# Patient Record
Sex: FEMALE | Employment: OTHER | URBAN - METROPOLITAN AREA
[De-identification: names, ages, dates, MRNs, and addresses within clinical notes are randomized per-mention and may not be internally consistent; named-entity substitution may affect disease eponyms.]

---

## 2022-09-15 ENCOUNTER — OFFICE VISIT (OUTPATIENT)
Dept: ORTHOPEDIC SURGERY | Age: 42
End: 2022-09-15
Payer: COMMERCIAL

## 2022-09-15 DIAGNOSIS — M25.571 ACUTE RIGHT ANKLE PAIN: Primary | ICD-10-CM

## 2022-09-15 PROCEDURE — 99204 OFFICE O/P NEW MOD 45 MIN: CPT | Performed by: ORTHOPAEDIC SURGERY

## 2022-09-15 RX ORDER — MELOXICAM 15 MG/1
15 TABLET ORAL DAILY
Qty: 30 TABLET | Refills: 0 | Status: SHIPPED | OUTPATIENT
Start: 2022-09-15

## 2022-09-15 NOTE — PROGRESS NOTES
The patient was prescribed a walker boot and ½ heel lift for the patient's right foot. The patient wears a size 9 shoe and I fitted them with a M size boot. There was also a pad placed on the Achilles to prevent from rubbing while in the boot. It was also stressed that the patient take the boot/sock off as much as possible to prevent infection and to insure healing. The patient was told to peel the heel lift every 3-5 days. The patient was fitted and instructed on the use of prescribed walker boot. I explained how to fit themselves and that the plastic flexible piece should always be on the front of the boot and secured by the Velcro straps on top. The air bladder in the boot was adjusted according to proper fit and comfort. The patient was instructed to walk heel toe once the patient starts applying pressure. I also explained that they need a heel lift or a higher heeled shoe for the uninvolved LE to help normalize gait and avoid excessive low back stress/strain due to leg length inequality created from walker boot. Patient read and signed documenting they understand and agree to [facility]'s current DME return policy.

## 2022-09-15 NOTE — PROGRESS NOTES
Name: Johnny Thornton  YOB: 1980  Gender: female  MRN: [id number]    Summary:  insertion achilles tendonitis with calcification. Start physical therapy, meloxicam, Voltaren gel, heel lifts, CAM Walker boot. Follow-up 2 months: If not improved consider night splint, air heel, short-term Medrol. No x-rays needed. CC: right Heel pain     HPI: Johnny Thornotn is a 40 y.o. female who presents with increasing posterior ankle pain located at the Achilles tendon region. They do remember any acute event that started. It started approximately September 2021 when she slipped and hit her heel on the back of a piece of furniture/metal.  She sustained a small laceration. Since then she has continued to have pain. It has been going on for since September 2021 and has gotten worse. The pain as a burning tearing pain, tender to palpation and pressure with shoe wear, and it limits daily activities. I also described some swelling in this region also. She has been treated by her primary care. She has started physical therapy. She has treated it with over-the-counter nonsteroidals and altered shoewear. She is activity modified and she no longer exercises. History was obtained by patient    ROS/Meds/PSH/PMH/FH/SH: I personally reviewed the patients standard intake form. Below are the pertinents    Tobacco:  has no history on file for tobacco use. Diabetes: None  Other: none    Physical Examination:  right Lower Extremity: FROM actively of toes, foot, ankle, knee and hip. No instability of foot or ankle with drawer and stress. 5/5 strength to TA/EHL/GSC/Peroneals/PTib. No skin lesions, Non TTP throughout. 2+ dp pulse w/ cap refill of 5 toes <2s. Dorsiflexion of the ankle produces pain at the Achilles insertion. Hind foot alignment mild varus. There is some edema, mild erythema, and TTP at the Achilles at the same point of tenderness. + silverskoid. Achilles has no defects but it is painful. Cavovarus foot posture when standing. Talar tilt exam : normal  Anterior drawer exam w/ ankle plantarflexed at 20 deg: normal    Neuro:  normal SILT to s/s/sp/dp/t. Reflexes normal: 1+ patella reflex bilaterally, 1+ achilles reflex bilaterally, negative babinski bilaterally. no signs of hyper reflexia or absent reflex    Vascular: radial=4/4, femoral=4/4, popliteal=4/4, dorsalis pedis=4/4,       Imaging:   I independently interpreted XR taken today and   3 views (AP/Lat/Oblique) of the on right ankle for achilles pain/ankle pain   Findings: no signs of acute fractures or dislocations. No signs of neoplastic process. There is some age related degenerative changes noted in the ankle but these are minimal.  The achilles tendon at the insertion shows some calcifications and there is an enlarged calcaneal process indicative of a Claudia deformity. Impression:  Insertional calcific achilles tendonitis. Pete Reynolds MD         Assessment:   insertion Achilles Tendonitis    Plan:     4 This is a chronic illness/condition with exacerbation and progression  Treatment at this time: Prescription Drug Management - Start physical therapy, meloxicam, Voltaren gel, heel lifts, CAM Walker boot. Studies ordered: NO XR needed @ Next Visit    Weight-bearing status: WBAT        Return to work/work restrictions: none  Mobic 15mg p.o. qday x 14 days: An Rx was given. We discussed the use of Mobic. I advised not to combine it with other NSAIDS such as advil, motrin, nor aleve. I discussed Mobic and its affect on the GI system, its risk of ulcer formation/exacerbation. I also discussed its affects on the kidneys and risk of nephritis and kidney damage. We discussed how it can alter your blood coagulability and limit platelet function, its negative affect on coronary artery disease, and how excessive alcohol use with Mobic can make all these problems worse.      Boni Cline MD    no surgery today - proceed with non op

## 2022-10-24 RX ORDER — MELOXICAM 15 MG/1
TABLET ORAL
Qty: 30 TABLET | Refills: 0 | OUTPATIENT
Start: 2022-10-24

## 2022-11-03 ENCOUNTER — TELEPHONE (OUTPATIENT)
Dept: ORTHOPEDIC SURGERY | Age: 42
End: 2022-11-03

## 2022-11-07 ENCOUNTER — TELEPHONE (OUTPATIENT)
Dept: ORTHOPEDIC SURGERY | Age: 42
End: 2022-11-07

## 2022-11-18 ENCOUNTER — OFFICE VISIT (OUTPATIENT)
Dept: ORTHOPEDIC SURGERY | Age: 42
End: 2022-11-18

## 2022-11-18 DIAGNOSIS — M25.571 ACUTE RIGHT ANKLE PAIN: Primary | ICD-10-CM

## 2022-11-18 RX ORDER — METHYLPREDNISOLONE 4 MG/1
TABLET ORAL
Qty: 1 KIT | Refills: 0 | Status: SHIPPED | OUTPATIENT
Start: 2022-11-18 | End: 2022-11-24

## 2022-11-18 NOTE — PROGRESS NOTES
Name: Lucy Baldwin  YOB: 1980  Gender: female  MRN: [id number]    Summary:  insertion achilles tendonitis with calcification -   Physical therapy, meloxicam, Voltaren gel, and walker boot have helped some. She is already using a night splint. She is having improvement but still painful. 11/18/2022: Prescription for Medrol Dosepak, Achilles air heel, continue physical therapy    Follow-up 2 months. If still painful consider MRI       CC: right Heel pain     HPI: Lucy Baldwin is a 40 y.o. female who presents with increasing posterior ankle pain located at the Achilles tendon region. They do remember any acute event that started. It started approximately September 2021 when she slipped and hit her heel on the back of a piece of furniture/metal.  She sustained a small laceration. Since then she has continued to have pain. It has been going on for since September 2021 and has gotten worse. The pain as a burning tearing pain, tender to palpation and pressure with shoe wear, and it limits daily activities. I also described some swelling in this region also. She has been treated by her primary care. She has started physical therapy. She has treated it with over-the-counter nonsteroidals and altered shoewear. She is activity modified and she no longer exercises. 11/18/2022-second encounter with the patient. She has been treated with 6 consecutive weeks of physical therapy, meloxicam, Voltaren gel,  night splintand a walker boot. She has made some improvements but she still continues to have pain at the insertion. She states she is doing better but still struggles. She is also using a night splint. History was obtained by patient    ROS/Meds/PSH/PMH/FH/SH: I personally reviewed the patients standard intake form. Below are the pertinents    Tobacco:  has no history on file for tobacco use.   Diabetes: None  Other: none    Physical Examination:  right Lower Extremity: FROM actively of toes, foot, ankle, knee and hip. No instability of foot or ankle with drawer and stress. 5/5 strength to TA/EHL/GSC/Peroneals/PTib. No skin lesions, Non TTP throughout. 2+ dp pulse w/ cap refill of 5 toes <2s. Dorsiflexion of the ankle produces pain at the Achilles insertion. Hind foot alignment mild varus. There is some edema, mild erythema, and TTP at the Achilles at the same point of tenderness. + silverskoid. Achilles has no defects but it is painful. Cavovarus foot posture when standing. Talar tilt exam : normal  Anterior drawer exam w/ ankle plantarflexed at 20 deg: normal    Neuro:  normal SILT to s/s/sp/dp/t. Reflexes normal: 1+ patella reflex bilaterally, 1+ achilles reflex bilaterally, negative babinski bilaterally. no signs of hyper reflexia or absent reflex    Vascular: radial=4/4, femoral=4/4, popliteal=4/4, dorsalis pedis=4/4,       Imaging:   I independently interpreted XR taken today and   3 views (AP/Lat/Oblique) of the on right ankle for achilles pain/ankle pain   Findings: no signs of acute fractures or dislocations. No signs of neoplastic process. There is some age related degenerative changes noted in the ankle but these are minimal.  The achilles tendon at the insertion shows some calcifications and there is an enlarged calcaneal process indicative of a Claudia deformity. Impression:  Insertional calcific achilles tendonitis. Ashley Tejada MD         Assessment:   insertion Achilles Tendonitis    Plan:     4 This is a chronic illness/condition with exacerbation and progression  Treatment at this time: Prescription Drug Management -we will get her out of the cam walker boot and put her into an Achilles air heel brace and she will slowly become less boot dependent. I did discuss with her the risk of wearing the boot too long. I still want her to use a night splint. She will restart physical therapy as it helps.   Studies ordered: NO XR needed @ Next Visit    Weight-bearing status: WBAT        Return to work/work restrictions: none  Medrol Dose pack (6 day oral taper): I discussed medrol being a steroid and how it can elevate blood sugars. I recommended to monitor sugars closely and to beware of symptoms such as lethargy, excessive thirst, polyuria, and GI distress. We also discussed the dose pack taper format and how long term steroid use can alter adrenal function. I also discussed steroids effect on depression and mood. How in some people it can exacerbate underlying depression while in others create a more manic response.      Genevieve Cervantes MD    no surgery today - proceed with non op

## 2022-11-18 NOTE — PROGRESS NOTES
The patient was prescribed and given an aircast airheel for the right foot, size medium. She was instructed on use of the brace during the visit. Patient read and signed documenting they understand and agree to [facility]'s current DME return policy.

## 2022-11-21 ENCOUNTER — TELEPHONE (OUTPATIENT)
Dept: ORTHOPEDIC SURGERY | Age: 42
End: 2022-11-21

## 2022-11-21 NOTE — TELEPHONE ENCOUNTER
Received leave form from [place], sent to [facility] for completion

## 2022-11-25 ENCOUNTER — TELEPHONE (OUTPATIENT)
Dept: ORTHOPEDIC SURGERY | Age: 42
End: 2022-11-25

## 2022-11-25 DIAGNOSIS — M25.571 ACUTE RIGHT ANKLE PAIN: Primary | ICD-10-CM

## 2022-12-07 ENCOUNTER — TELEPHONE (OUTPATIENT)
Dept: ORTHOPEDIC SURGERY | Age: 42
End: 2022-12-07

## 2022-12-13 ENCOUNTER — TELEPHONE (OUTPATIENT)
Dept: ORTHOPEDIC SURGERY | Age: 42
End: 2022-12-13

## 2022-12-13 NOTE — TELEPHONE ENCOUNTER
So records that were sent in to [facility] has her as WBAT no restricitions and she says she is not able to return to work so this may be having to kick over to [employer] term disability so she needs to speak to you about a work note to continue her coverage?  I told her she may have to come back in for eval but she would need to discuss that with you

## 2023-01-17 ENCOUNTER — OFFICE VISIT (OUTPATIENT)
Dept: ORTHOPEDIC SURGERY | Age: 43
End: 2023-01-17
Payer: MEDICAID

## 2023-01-17 DIAGNOSIS — M76.61 ACHILLES TENDINITIS OF RIGHT LOWER EXTREMITY: Primary | ICD-10-CM

## 2023-01-17 PROCEDURE — 99213 OFFICE O/P EST LOW 20 MIN: CPT | Performed by: ORTHOPAEDIC SURGERY

## 2023-01-17 NOTE — PROGRESS NOTES
Name: Radha Naqvi  YOB: 1980  Gender: female  MRN: [id number]    Summary:  insertion achilles tendonitis with calcification -   Physical therapy, meloxicam, Voltaren gel, and walker boot have helped some. She is already using a night splint . Prescription for Medrol Dosepak, Achilles air heel, continue physical therapy. With a lot of improvement. She can call back for more therapy prescription if she needs to. If still painful consider MRI       CC: right Heel pain     HPI: Radha Naqvi is a 40 y.o. female who presents with increasing posterior ankle pain located at the Achilles tendon region. They do remember any acute event that started. It started approximately September 2021 when she slipped and hit her heel on the back of a piece of furniture/metal.  She sustained a small laceration. Since then she has continued to have pain. It has been going on for since September 2021 and has gotten worse. The pain as a burning tearing pain, tender to palpation and pressure with shoe wear, and it limits daily activities. I also described some swelling in this region also. She has been treated by her primary care. She has started physical therapy. She has treated it with over-the-counter nonsteroidals and altered shoewear. She is activity modified and she no longer exercises. 11/18/2022-second encounter with the patient. She has been treated with 6 consecutive weeks of physical therapy, meloxicam, Voltaren gel,  night splintand a walker boot. She has made some improvements but she still continues to have pain at the insertion. She states she is doing better but still struggles. She is also using a night splint. 1/17/2023-she is very pleased today. This is my third encounter. She states that the therapy has helped a lot. Her pain is much less. She wears the Achilles air heel as needed, she is walk around the house barefoot and she has much less pain.   She is no longer limited in what she does. She is wearing normal shoes today. She is stating her pain at its worst now is a 1 out of 10. History was obtained by patient    ROS/Meds/PSH/PMH/FH/SH: I personally reviewed the patients standard intake form. Below are the pertinents    Tobacco:  has no history on file for tobacco use. Diabetes: None  Other: none    Physical Examination:  right Lower Extremity: FROM actively of toes, foot, ankle, knee and hip. No instability of foot or ankle with drawer and stress. 5/5 strength to TA/EHL/GSC/Peroneals/PTib. No skin lesions, Non TTP throughout. 2+ dp pulse w/ cap refill of 5 toes <2s. No longer painful at the Achilles insertion. No swelling noted around the Achilles. No palpable defects or inflamed bursa's. Nontender throughout the retrocalcaneal bursa. No pain with resisted dorsiflexion or plantarflexion. Cavovarus foot posture when standing. Talar tilt exam : normal  Anterior drawer exam w/ ankle plantarflexed at 20 deg: normal    Neuro:  normal SILT to s/s/sp/dp/t. Reflexes normal: 1+ patella reflex bilaterally, 1+ achilles reflex bilaterally, negative babinski bilaterally. no signs of hyper reflexia or absent reflex    Vascular: radial=4/4, femoral=4/4, popliteal=4/4, dorsalis pedis=4/4,       Imaging:   I independently interpreted XR taken today an review of prior films  3 views (AP/Lat/Oblique) of the on right ankle for achilles pain/ankle pain   Findings: no signs of acute fractures or dislocations. No signs of neoplastic process. There is some age related degenerative changes noted in the ankle but these are minimal.  The achilles tendon at the insertion shows some calcifications and there is an enlarged calcaneal process indicative of a Claudia deformity. Impression:  Insertional calcific achilles tendonitis.     Daniel Ching MD         Assessment:   insertion Achilles Tendonitis    Plan:     3 This is stable chronic illness/condition  Treatment at this time: She will continue physical therapy until she is discharged. At this point I think that she can probably mostly exercises at home. She still uses Voltaren gel. She has some anti-inflammatory such as meloxicam if needed. She will continue treating this nonoperatively. She will call us back if needed.   Studies ordered: NO XR needed @ Next Visit    Weight-bearing status: WBAT        Return to work/work restrictions: none  No medications given    Radha Bah MD    no surgery today - proceed with non op

## 2023-05-31 ENCOUNTER — TELEPHONE (OUTPATIENT)
Dept: ORTHOPEDIC SURGERY | Age: 43
End: 2023-05-31

## 2023-09-21 ENCOUNTER — OFFICE VISIT (OUTPATIENT)
Dept: ORTHOPEDIC SURGERY | Age: 43
End: 2023-09-21

## 2023-09-21 DIAGNOSIS — M25.571 ACUTE RIGHT ANKLE PAIN: Primary | ICD-10-CM

## 2023-09-21 RX ORDER — METHYLPREDNISOLONE 4 MG/1
TABLET ORAL
COMMUNITY
Start: 2023-06-20

## 2023-09-21 RX ORDER — NAPROXEN 500 MG/1
TABLET ORAL
COMMUNITY
Start: 2023-02-24

## 2023-09-21 RX ORDER — MELOXICAM 15 MG/1
15 TABLET ORAL DAILY
Qty: 30 TABLET | Refills: 3 | Status: SHIPPED | OUTPATIENT
Start: 2023-09-21

## 2023-09-21 RX ORDER — CARBAMAZEPINE 200 MG/1
CAPSULE, EXTENDED RELEASE ORAL
COMMUNITY
Start: 2023-06-21

## 2023-09-21 RX ORDER — ACETAMINOPHEN AND CODEINE PHOSPHATE 120; 12 MG/5ML; MG/5ML
1 SOLUTION ORAL DAILY
COMMUNITY
Start: 2023-07-07

## 2023-09-21 RX ORDER — CYCLOBENZAPRINE HCL 10 MG
TABLET ORAL
COMMUNITY
Start: 2023-02-24

## 2023-09-21 RX ORDER — PHENTERMINE HYDROCHLORIDE 37.5 MG/1
CAPSULE ORAL
COMMUNITY
Start: 2023-08-01

## 2023-09-21 RX ORDER — ERGOCALCIFEROL 1.25 MG/1
50000 CAPSULE ORAL
COMMUNITY
Start: 2023-08-12

## 2023-09-21 NOTE — PROGRESS NOTES
Name: Anton Willams  YOB: 1980  Gender: female  MRN: [id number]    Summary:  insertion achilles tendonitis with calcification -   Physical therapy, meloxicam, Voltaren gel, and walker boot have helped some. We will continue these. She continues to have brief flares and flareups. Follow-up in 8 weeks no x-rays. If not improving consider calcaneal osteotomy       CC: right Heel pain     HPI: Anton Willams is a 44 y.o. female who presents with increasing posterior ankle pain located at the Achilles tendon region. They do remember any acute event that started. It started approximately September 2021 when she slipped and hit her heel on the back of a piece of furniture/metal.  She sustained a small laceration. Since then she has continued to have pain. It has been going on for since September 2021 and has gotten worse. The pain as a burning tearing pain, tender to palpation and pressure with shoe wear, and it limits daily activities. I also described some swelling in this region also. She has been treated by her primary care. She has started physical therapy. She has treated it with over-the-counter nonsteroidals and altered shoewear. She is activity modified and she no longer exercises. 11/18/2022-second encounter with the patient. She has been treated with 6 consecutive weeks of physical therapy, meloxicam, Voltaren gel,  night splintand a walker boot. She has made some improvements but she still continues to have pain at the insertion. She states she is doing better but still struggles. She is also using a night splint. 1/17/2023-she is very pleased today. This is my third encounter. She states that the therapy has helped a lot. Her pain is much less. She wears the Achilles air heel as needed, she is walk around the house barefoot and she has much less pain. She is no longer limited in what she does. She is wearing normal shoes today.   She is stating her pain at its

## 2025-03-21 ENCOUNTER — PATIENT MESSAGE (OUTPATIENT)
Dept: ORTHOPEDIC SURGERY | Age: 45
End: 2025-03-21

## 2025-04-04 ENCOUNTER — OFFICE VISIT (OUTPATIENT)
Dept: ORTHOPEDIC SURGERY | Age: 45
End: 2025-04-04
Payer: MEDICAID

## 2025-04-04 DIAGNOSIS — M76.61 ACHILLES TENDINITIS OF RIGHT LOWER EXTREMITY: Primary | ICD-10-CM

## 2025-04-04 DIAGNOSIS — R52 PAIN: ICD-10-CM

## 2025-04-04 PROCEDURE — 99214 OFFICE O/P EST MOD 30 MIN: CPT | Performed by: PHYSICIAN ASSISTANT

## 2025-04-04 RX ORDER — METHYLPREDNISOLONE 4 MG/1
TABLET ORAL
Qty: 1 KIT | Refills: 0 | Status: SHIPPED | OUTPATIENT
Start: 2025-04-04 | End: 2025-04-10

## 2025-04-04 NOTE — PROGRESS NOTES
Patient was fitted and instructed on a Heel Lift for the right foot. Patient read and signed documenting they understand and agree to [facility]'s current DME return policy.

## 2025-04-04 NOTE — PROGRESS NOTES
Name: Marta Terrell  YOB: 1980  Gender: female  MRN: [id number]    Summary:  insertion achilles tendonitis with calcification -   She continues to have brief flares and flareups.    Heel lift and Medrol Dosepak given today    Patient would like to proceed with surgery in the summer.  Consent to read: Right Zadek calcaneal osteotomy    Follow-up in May for preoperative appointment       CC: right Heel pain     HPI: Marta Terrell is a 44 y.o. female who presents with increasing posterior ankle pain located at the Achilles tendon region.  They do remember any acute event that started.  It started approximately September 2021 when she slipped and hit her heel on the back of a piece of furniture/metal.  She sustained a small laceration.  Since then she has continued to have pain.  It has been going on for since September 2021 and has gotten worse.  The pain as a burning tearing pain, tender to palpation and pressure with shoe wear, and it limits daily activities.  I also described some swelling in this region also.   She has been treated by her primary care.  She has started physical therapy.  She has treated it with over-the-counter nonsteroidals and altered shoewear.  She is activity modified and she no longer exercises.    11/18/2022-second encounter with the patient.  She has been treated with 6 consecutive weeks of physical therapy, meloxicam, Voltaren gel,  night splintand a walker boot.  She has made some improvements but she still continues to have pain at the insertion.  She states she is doing better but still struggles.  She is also using a night splint.    1/17/2023-she is very pleased today.  This is my third encounter.  She states that the therapy has helped a lot.  Her pain is much less.  She wears the Achilles air heel as needed, she is walk around the house barefoot and she has much less pain.  She is no longer limited in what she does.  She is wearing normal shoes today.  She is